# Patient Record
Sex: FEMALE | Race: WHITE | NOT HISPANIC OR LATINO
[De-identification: names, ages, dates, MRNs, and addresses within clinical notes are randomized per-mention and may not be internally consistent; named-entity substitution may affect disease eponyms.]

---

## 2017-05-15 PROBLEM — Z00.00 ENCOUNTER FOR PREVENTIVE HEALTH EXAMINATION: Status: ACTIVE | Noted: 2017-05-15

## 2017-06-13 ENCOUNTER — APPOINTMENT (OUTPATIENT)
Dept: UROLOGY | Facility: CLINIC | Age: 37
End: 2017-06-13

## 2019-07-12 ENCOUNTER — EMERGENCY (EMERGENCY)
Facility: HOSPITAL | Age: 39
LOS: 0 days | Discharge: HOME | End: 2019-07-12
Attending: EMERGENCY MEDICINE | Admitting: EMERGENCY MEDICINE

## 2019-07-12 VITALS
HEART RATE: 83 BPM | HEIGHT: 66 IN | SYSTOLIC BLOOD PRESSURE: 157 MMHG | DIASTOLIC BLOOD PRESSURE: 93 MMHG | RESPIRATION RATE: 18 BRPM | OXYGEN SATURATION: 100 % | TEMPERATURE: 98 F | WEIGHT: 164.91 LBS

## 2019-07-12 DIAGNOSIS — Z53.21 PROCEDURE AND TREATMENT NOT CARRIED OUT DUE TO PATIENT LEAVING PRIOR TO BEING SEEN BY HEALTH CARE PROVIDER: ICD-10-CM

## 2019-07-12 DIAGNOSIS — E16.2 HYPOGLYCEMIA, UNSPECIFIED: ICD-10-CM

## 2019-07-12 RX ORDER — SODIUM CHLORIDE 9 MG/ML
3 INJECTION INTRAMUSCULAR; INTRAVENOUS; SUBCUTANEOUS ONCE
Refills: 0 | Status: DISCONTINUED | OUTPATIENT
Start: 2019-07-12 | End: 2019-07-12

## 2019-07-12 NOTE — ED ADULT TRIAGE NOTE - CHIEF COMPLAINT QUOTE
pt presents to ED with complaints of hypoglycemia. 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 FS 54 upon EMS arrival. 1 tube oral glucose give. repeat FS 65. FS on ED arrival 70.

## 2019-07-12 NOTE — ED ADULT NURSE NOTE - EXPLANATION OF PATIENT'S REASON FOR LEAVING
pt is diabetic- and states she is aware of what happened. she stated she gave herself too much insulin for her drink. pt alert, oriented, ambulatory with steady gait. pt is diabetic- and states she is aware of what happened. she stated she gave herself too much insulin for her drink. pt alert, oriented, ambulatory with steady gait. no IV in place.

## 2022-08-10 ENCOUNTER — FORM ENCOUNTER (OUTPATIENT)
Age: 42
End: 2022-08-10

## 2022-08-14 ENCOUNTER — FORM ENCOUNTER (OUTPATIENT)
Age: 42
End: 2022-08-14

## 2022-09-21 PROBLEM — Z78.9 OTHER SPECIFIED HEALTH STATUS: Chronic | Status: ACTIVE | Noted: 2019-07-12

## 2022-10-05 ENCOUNTER — APPOINTMENT (OUTPATIENT)
Dept: INFECTIOUS DISEASE | Facility: CLINIC | Age: 42
End: 2022-10-05

## 2022-10-05 ENCOUNTER — NON-APPOINTMENT (OUTPATIENT)
Age: 42
End: 2022-10-05

## 2022-10-05 VITALS
SYSTOLIC BLOOD PRESSURE: 118 MMHG | OXYGEN SATURATION: 100 % | BODY MASS INDEX: 24.8 KG/M2 | RESPIRATION RATE: 15 BRPM | DIASTOLIC BLOOD PRESSURE: 80 MMHG | WEIGHT: 154.32 LBS | HEART RATE: 85 BPM | HEIGHT: 66 IN | TEMPERATURE: 98 F

## 2022-10-05 DIAGNOSIS — Z87.81 PERSONAL HISTORY OF (HEALED) TRAUMATIC FRACTURE: ICD-10-CM

## 2022-10-05 DIAGNOSIS — H35.81 RETINAL EDEMA: ICD-10-CM

## 2022-10-05 DIAGNOSIS — N18.9 CHRONIC KIDNEY DISEASE, UNSPECIFIED: ICD-10-CM

## 2022-10-05 DIAGNOSIS — Z94.0 KIDNEY TRANSPLANT STATUS: ICD-10-CM

## 2022-10-05 DIAGNOSIS — Z96.41 PRESENCE OF INSULIN PUMP (EXTERNAL) (INTERNAL): ICD-10-CM

## 2022-10-05 DIAGNOSIS — I63.9 CEREBRAL INFARCTION, UNSPECIFIED: ICD-10-CM

## 2022-10-05 DIAGNOSIS — A04.72 ENTEROCOLITIS DUE TO CLOSTRIDIUM DIFFICILE, NOT SPECIFIED AS RECURRENT: ICD-10-CM

## 2022-10-05 DIAGNOSIS — E10.9 TYPE 1 DIABETES MELLITUS W/OUT COMPLICATIONS: ICD-10-CM

## 2022-10-05 DIAGNOSIS — R74.01 ELEVATION OF LEVELS OF LIVER TRANSAMINASE LEVELS: ICD-10-CM

## 2022-10-05 DIAGNOSIS — Z90.49 ACQUIRED ABSENCE OF OTHER SPECIFIED PARTS OF DIGESTIVE TRACT: ICD-10-CM

## 2022-10-05 PROCEDURE — 99213 OFFICE O/P EST LOW 20 MIN: CPT

## 2022-10-05 RX ORDER — INSULIN ASPART INJECTION 100 [IU]/ML
INJECTION, SOLUTION SUBCUTANEOUS
Refills: 0 | Status: ACTIVE | COMMUNITY

## 2022-10-05 RX ORDER — TACROLIMUS 0.5 MG/1
CAPSULE ORAL
Refills: 0 | Status: ACTIVE | COMMUNITY

## 2022-10-05 NOTE — REVIEW OF SYSTEMS
[Fever] : no fever [Chills] : no chills [Body Aches] : no body aches [Shortness Of Breath] : no shortness of breath [Wheezing] : no wheezing [Abdominal Pain] : no abdominal pain [Vomiting] : no vomiting [Diarrhea] : no diarrhea [Joint Pain] : no joint pain [Joint Swelling] : no joint swelling [Dizziness] : no dizziness [Limb Weakness] : no limb weakness [Anxiety] : no anxiety [Easy Bleeding] : no tendency for easy bleeding [Easy Bruising] : no tendency for easy bruising [Negative] : Cardiovascular

## 2022-10-05 NOTE — REASON FOR VISIT
[Post Hospitalization] : a post hospitalization visit [Parent] : parent [FreeTextEntry1] : fu from hospital stay ([facility]) for C Diff\par pt completed 10 day course Dificid\par pt reports that her PCP advised pt against taking Bactrim 1 DS MWF due to possible kidney toxicity

## 2022-10-05 NOTE — HISTORY OF PRESENT ILLNESS
[FreeTextEntry1] : Patient presents the office today for hospital follow-up.  She is a 42-year-old female with a history of type 1 diabetes and chronic kidney disease who underwent a renal transplant previously is on chronic immunosuppressive therapy who was admitted to [facility] in mid August with sepsis and hypovolemia secondary to severe diarrhea.  She had a stool test that was PCR positive for C. difficile but toxin negative and also had a stool PCR that was positive for Sapovirus which is similar to [place] virus\par Due to ongoing renal failure he was forced to restart dialysis during the hospitalization and had a right chest wall permacatheter placed.  She is currently getting dialysis twice a week and she still makes urine.\par She has been on probiotic therapy with Florastor since discharge from the hospital.  She has had no diarrhea and all her bowel movements have been formed.  She is not on any current C. difficile therapy.

## 2022-10-05 NOTE — PHYSICAL EXAM
[General Appearance - Alert] : alert [General Appearance - In No Acute Distress] : in no acute distress [General Appearance - Well Nourished] : well nourished [Sclera] : the sclera and conjunctiva were normal [Extraocular Movements] : extraocular movements were intact [Examination Of The Oral Cavity] : the lips and gums were normal [Oropharynx] : the oropharynx was normal with no thrush [Respiration, Rhythm And Depth] : normal respiratory rhythm and effort [Heart Sounds] : normal S1 and S2 [Abdomen Soft] : soft [Abdomen Tenderness] : non-tender [Nail Clubbing] : no clubbing  or cyanosis of the fingernails [] : no rash [FreeTextEntry1] : right chest wall HD catheter  [Sensation] : the sensory exam was normal to light touch and pinprick [Motor Exam] : the motor exam was normal [Affect] : the affect was normal

## 2022-10-05 NOTE — ASSESSMENT
[FreeTextEntry1] : 42-year-old female with type 1 diabetes status post prior renal transplant was admitted to the hospital in August for sepsis secondary to diarrhea likely is a combination from C. difficile and a viral infection.  She developed acute kidney injury during that hospitalization and is now on maintenance hemodialysis despite having the renal transplant.  She has had no recurrence of her diarrhea or GI illness.  Of concern she is still on immunosuppressive medications with a transplant and is taking Florastor and there is a small risk of her developing secondary fungemia while being on this medication.\par \par Recommendations\par 1.  DC Florastor\par 2.  Can use any other form of bacterial probiotic therapy if needed such as Kefir or acidophilus\par 3.  Did not need any further C. difficile therapy we had a discussion that there is a chance that she may relapse with the disease especially in light of her immune suppression and she will follow closely for any return of diarrheal symptoms.\par \par Patient will return to the office as a as needed basis\par Case was discussed with the patient and her mother who accompanied her to the visit.

## 2023-03-21 ENCOUNTER — TRANSCRIPTION ENCOUNTER (OUTPATIENT)
Age: 43
End: 2023-03-21

## 2023-03-28 NOTE — ED ADULT TRIAGE NOTE - TEMPERATURE IN CELSIUS (DEGREES C)
Patient arrived at clinic today for a blood pressure check.  Blood pressure taken per protocol.     Patients blood pressure was 122/70 with a pulse of 68.      Patient is currently taking metoprolol 50mg tab BID    Patient denies headache, chest pain, SOB, swelling, and dizziness.     BP Readings from Last 4 Encounters:   03/14/23 (!) 150/98   02/24/23 (!) 145/80   10/25/22 126/72   09/27/22 126/74     Per Dr.Adriana Bonifacio MD office note from 3/14/23  She stopped metoprolol ,not sure why  New prescription sent  To have BP checked in 2 weeks  If BP >140/80 to try addind amlodipine 2.5 mg a day  
36.6

## 2023-04-23 ENCOUNTER — NON-APPOINTMENT (OUTPATIENT)
Age: 43
End: 2023-04-23